# Patient Record
Sex: MALE | Race: WHITE | NOT HISPANIC OR LATINO | URBAN - METROPOLITAN AREA
[De-identification: names, ages, dates, MRNs, and addresses within clinical notes are randomized per-mention and may not be internally consistent; named-entity substitution may affect disease eponyms.]

---

## 2023-11-03 ENCOUNTER — HOSPITAL ENCOUNTER (EMERGENCY)
Facility: HOSPITAL | Age: 33
Discharge: HOME | End: 2023-11-03
Attending: EMERGENCY MEDICINE

## 2023-11-03 VITALS
WEIGHT: 230 LBS | RESPIRATION RATE: 18 BRPM | TEMPERATURE: 97.9 F | HEIGHT: 68 IN | DIASTOLIC BLOOD PRESSURE: 92 MMHG | SYSTOLIC BLOOD PRESSURE: 146 MMHG | OXYGEN SATURATION: 97 % | HEART RATE: 79 BPM | BODY MASS INDEX: 34.86 KG/M2

## 2023-11-03 DIAGNOSIS — R21 RASH: ICD-10-CM

## 2023-11-03 DIAGNOSIS — L73.9 FOLLICULITIS: Primary | ICD-10-CM

## 2023-11-03 LAB
ALBUMIN SERPL BCP-MCNC: 4.3 G/DL (ref 3.4–5)
ALP SERPL-CCNC: 101 U/L (ref 33–120)
ALT SERPL W P-5'-P-CCNC: 93 U/L (ref 10–52)
ANION GAP SERPL CALC-SCNC: 13 MMOL/L (ref 10–20)
AST SERPL W P-5'-P-CCNC: 57 U/L (ref 9–39)
BILIRUB SERPL-MCNC: 0.7 MG/DL (ref 0–1.2)
BUN SERPL-MCNC: 9 MG/DL (ref 6–23)
CALCIUM SERPL-MCNC: 9.1 MG/DL (ref 8.6–10.3)
CHLORIDE SERPL-SCNC: 103 MMOL/L (ref 98–107)
CO2 SERPL-SCNC: 25 MMOL/L (ref 21–32)
CREAT SERPL-MCNC: 0.75 MG/DL (ref 0.5–1.3)
ERYTHROCYTE [DISTWIDTH] IN BLOOD BY AUTOMATED COUNT: 12.4 % (ref 11.5–14.5)
GFR SERPL CREATININE-BSD FRML MDRD: >90 ML/MIN/1.73M*2
GLUCOSE SERPL-MCNC: 111 MG/DL (ref 74–99)
HCT VFR BLD AUTO: 43.3 % (ref 41–52)
HGB BLD-MCNC: 15.5 G/DL (ref 13.5–17.5)
MCH RBC QN AUTO: 31.1 PG (ref 26–34)
MCHC RBC AUTO-ENTMCNC: 35.8 G/DL (ref 32–36)
MCV RBC AUTO: 87 FL (ref 80–100)
NRBC BLD-RTO: 0 /100 WBCS (ref 0–0)
PLATELET # BLD AUTO: 208 X10*3/UL (ref 150–450)
POTASSIUM SERPL-SCNC: 3.6 MMOL/L (ref 3.5–5.3)
PROT SERPL-MCNC: 7.8 G/DL (ref 6.4–8.2)
RBC # BLD AUTO: 4.99 X10*6/UL (ref 4.5–5.9)
SODIUM SERPL-SCNC: 137 MMOL/L (ref 136–145)
WBC # BLD AUTO: 9.6 X10*3/UL (ref 4.4–11.3)

## 2023-11-03 PROCEDURE — 99284 EMERGENCY DEPT VISIT MOD MDM: CPT | Performed by: EMERGENCY MEDICINE

## 2023-11-03 PROCEDURE — 80053 COMPREHEN METABOLIC PANEL: CPT | Performed by: EMERGENCY MEDICINE

## 2023-11-03 PROCEDURE — 36415 COLL VENOUS BLD VENIPUNCTURE: CPT | Performed by: EMERGENCY MEDICINE

## 2023-11-03 PROCEDURE — 85027 COMPLETE CBC AUTOMATED: CPT | Performed by: EMERGENCY MEDICINE

## 2023-11-03 PROCEDURE — 99283 EMERGENCY DEPT VISIT LOW MDM: CPT

## 2023-11-03 RX ORDER — CHLORHEXIDINE GLUCONATE 40 MG/ML
SOLUTION TOPICAL DAILY PRN
Qty: 120 ML | Refills: 0 | Status: SHIPPED | OUTPATIENT
Start: 2023-11-03

## 2023-11-03 RX ORDER — CEPHALEXIN 500 MG/1
500 CAPSULE ORAL 4 TIMES DAILY
Qty: 40 CAPSULE | Refills: 0 | Status: SHIPPED | OUTPATIENT
Start: 2023-11-03 | End: 2023-11-13

## 2023-11-03 ASSESSMENT — COLUMBIA-SUICIDE SEVERITY RATING SCALE - C-SSRS
1. IN THE PAST MONTH, HAVE YOU WISHED YOU WERE DEAD OR WISHED YOU COULD GO TO SLEEP AND NOT WAKE UP?: NO
2. HAVE YOU ACTUALLY HAD ANY THOUGHTS OF KILLING YOURSELF?: NO
6. HAVE YOU EVER DONE ANYTHING, STARTED TO DO ANYTHING, OR PREPARED TO DO ANYTHING TO END YOUR LIFE?: NO

## 2023-11-03 ASSESSMENT — LIFESTYLE VARIABLES
HAVE PEOPLE ANNOYED YOU BY CRITICIZING YOUR DRINKING: NO
HAVE YOU EVER FELT YOU SHOULD CUT DOWN ON YOUR DRINKING: NO
REASON UNABLE TO ASSESS: NO
EVER FELT BAD OR GUILTY ABOUT YOUR DRINKING: NO
EVER HAD A DRINK FIRST THING IN THE MORNING TO STEADY YOUR NERVES TO GET RID OF A HANGOVER: NO

## 2023-11-03 ASSESSMENT — PAIN - FUNCTIONAL ASSESSMENT: PAIN_FUNCTIONAL_ASSESSMENT: 0-10

## 2023-11-03 NOTE — DISCHARGE INSTRUCTIONS
Seek immediate medical attention if you develop: increasing redness of your skin, worsening rash, difficulty swallowing, drooling, difficulty breathing, chest pain, shortness of breath, lightheadedness, dizziness, fever, chills, or worsening symptoms.    Follow up your liver blood work with your doctor.    Keep your skin dry.

## 2023-11-03 NOTE — ED PROVIDER NOTES
HPI   Chief Complaint   Patient presents with    Rash     Stomach and arms, x few weeks, pt was given medications and improved, rash returned       33-year-old male to [facility] part with chief complaint of rash.  He had a rash around 2 weeks ago start in his thighs and abdomen.  He went to urgent care and they put him on an antihistamine and some steroids and he states it improved.  States last night it started coming back.  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  No other issues or complaints.  No fevers or chills or sweats.  No sore throat.  No oral lesions.  No chest pain or shortness of breath or cough.  No abdominal pain or nausea or vomiting or diarrhea or urinary symptoms.  No headaches or dizziness.    Past Medical History: None    Surgical History: Elbow surgery    Medications: See Nursing Notes    Allergies: No known drug allergies    Social History: No tobacco, occasional marijuana, occasional alcohol    Primary Care Physician: None    Unless otherwise stated in this report the patient's positive and negative responses for review of systems for constitutional, eyes, ENT, cardiovascular, respiratory, gastrointestinal, neurological, genitourinary, musculoskeletal, and integument systems and related systems to the presenting problem are either as stated in the HPI or were not pertinent or were negative for the symptoms and/or complaints related to the presenting medical problem.      EXAM:  Vital signs reviewed  General:  Appears well  Alert  HEENT:  Head atraumatic  Eyes normal inspection  Normal ENT inspection, no oral lesions  NECK:  Normal inspection  RESPIRATORY:  Normal breath sounds, no wheezing or rhonchi  No chest wall tenderness  No respiratory distress  CVS:  Heart rate and rhythm regular  No Murmurs  ABDOMEN/GI:  Soft  Non-tender  Bowel sounds normal  FEMALE GENITAL:  []  MALE GENITAL:  []  RECTAL:  []  BACK:  Normal inspection  EXTREMITIES:  Non-Tender  Full ROM  Normal  appearance  NEURO:  Alert and oriented X 3  CN's normal as tested  Sensation normal  Motor normal  PSYCH:  Mood normal  Affect normal  SKIN:  Color normal  There is an erythematous rash on his abdomen.  It is only on the lower abdomen and under the area of the pannus.  It is erythematous and does not mckay.  There are some areas that he has been scratching at.  Is also a rash on his inner thighs.  It looks more follicular.  There are areas that he is been scratching.  He also has athlete's foot on his feet.  Warm  Dry    Since there is a nonblanching rash and it is almost a petechial component on the abdomen, we will check lab work to make sure his platelets are okay.  The rash does not involve his lower legs or his feet.  There is no involvement of his hands.  No involvement of the palms or soles of his hands and feet.  There is no oral lesions.                                  [place] Coma Scale Score: 15                  Patient History   History reviewed. No pertinent past medical history.  History reviewed. No pertinent surgical history.  No family history on file.  Social History     Tobacco Use    Smoking status: Never    Smokeless tobacco: Never   Vaping Use    Vaping Use: Not on file   Substance Use Topics    Alcohol use: Yes     Comment: socially    Drug use: Never       Physical Exam   ED Triage Vitals [11/03/23 1202]   Temp Heart Rate Resp BP   36.6 °C (97.9 °F) 90 18 --      SpO2 Temp Source Heart Rate Source Patient Position   98 % Temporal Monitor Sitting      BP Location FiO2 (%)     Right arm --       Physical Exam    ED Course & MDM   Diagnoses as of 11/03/23 2037   Folliculitis   Rash       Medical Decision Making  Chemistries unremarkable except for mildly elevated AST and ALT.  Does drink alcohol.  Is advised to decrease his alcohol consumption.  CBC has no significant abnormalities.  Platelets are normal.    Patient is given chlorhexidine wash because his rash does have some consistency of  folliculitis.  He is also discharged home with a prescription for Keflex.  He is to follow-up with dermatology and primary care.  He is to return to the nearest ER for any new or worsening symptoms.  Is happy with this plan.        Procedure  Procedures     Dragan Molina,   11/03/23 2038

## 2023-11-17 ENCOUNTER — LAB (OUTPATIENT)
Dept: LAB | Facility: LAB | Age: 33
End: 2023-11-17

## 2023-11-17 DIAGNOSIS — L30.9 DERMATITIS, UNSPECIFIED: Primary | ICD-10-CM

## 2023-11-17 LAB
ANION GAP SERPL CALC-SCNC: 15 MMOL/L (ref 10–20)
BUN SERPL-MCNC: 11 MG/DL (ref 6–23)
CALCIUM SERPL-MCNC: 9.8 MG/DL (ref 8.6–10.3)
CHLORIDE SERPL-SCNC: 103 MMOL/L (ref 98–107)
CO2 SERPL-SCNC: 27 MMOL/L (ref 21–32)
CREAT SERPL-MCNC: 0.86 MG/DL (ref 0.5–1.3)
GFR SERPL CREATININE-BSD FRML MDRD: >90 ML/MIN/1.73M*2
GLUCOSE SERPL-MCNC: 96 MG/DL (ref 74–99)
POTASSIUM SERPL-SCNC: 4 MMOL/L (ref 3.5–5.3)
SODIUM SERPL-SCNC: 141 MMOL/L (ref 136–145)

## 2023-11-17 PROCEDURE — 36415 COLL VENOUS BLD VENIPUNCTURE: CPT

## 2023-11-17 PROCEDURE — 80048 BASIC METABOLIC PNL TOTAL CA: CPT

## 2023-12-07 ENCOUNTER — APPOINTMENT (OUTPATIENT)
Dept: PRIMARY CARE | Facility: CLINIC | Age: 33
End: 2023-12-07

## 2024-12-23 ENCOUNTER — APPOINTMENT (OUTPATIENT)
Dept: PRIMARY CARE | Facility: CLINIC | Age: 34
End: 2024-12-23
Payer: MEDICARE

## 2024-12-30 ENCOUNTER — APPOINTMENT (OUTPATIENT)
Dept: PRIMARY CARE | Facility: CLINIC | Age: 34
End: 2024-12-30
Payer: MEDICARE

## 2025-01-14 ENCOUNTER — APPOINTMENT (OUTPATIENT)
Dept: PRIMARY CARE | Facility: CLINIC | Age: 35
End: 2025-01-14
Payer: MEDICARE

## 2025-01-14 ENCOUNTER — LAB (OUTPATIENT)
Dept: LAB | Facility: LAB | Age: 35
End: 2025-01-14
Payer: MEDICARE

## 2025-01-14 VITALS
RESPIRATION RATE: 16 BRPM | BODY MASS INDEX: 41.07 KG/M2 | WEIGHT: 271 LBS | OXYGEN SATURATION: 98 % | HEART RATE: 63 BPM | TEMPERATURE: 97 F | HEIGHT: 68 IN | DIASTOLIC BLOOD PRESSURE: 100 MMHG | SYSTOLIC BLOOD PRESSURE: 130 MMHG

## 2025-01-14 DIAGNOSIS — E66.813 CLASS 3 SEVERE OBESITY WITH BODY MASS INDEX (BMI) OF 40.0 TO 44.9 IN ADULT, UNSPECIFIED OBESITY TYPE, UNSPECIFIED WHETHER SERIOUS COMORBIDITY PRESENT: ICD-10-CM

## 2025-01-14 DIAGNOSIS — E66.01 CLASS 3 SEVERE OBESITY WITH BODY MASS INDEX (BMI) OF 40.0 TO 44.9 IN ADULT, UNSPECIFIED OBESITY TYPE, UNSPECIFIED WHETHER SERIOUS COMORBIDITY PRESENT: ICD-10-CM

## 2025-01-14 DIAGNOSIS — R03.0 ELEVATED BLOOD PRESSURE READING: ICD-10-CM

## 2025-01-14 DIAGNOSIS — F32.A ANXIETY AND DEPRESSION: ICD-10-CM

## 2025-01-14 DIAGNOSIS — Z13.9 SCREENING DUE: ICD-10-CM

## 2025-01-14 DIAGNOSIS — Z13.9 SCREENING DUE: Primary | ICD-10-CM

## 2025-01-14 DIAGNOSIS — F41.9 ANXIETY AND DEPRESSION: ICD-10-CM

## 2025-01-14 LAB
ALBUMIN SERPL BCP-MCNC: 4.7 G/DL (ref 3.4–5)
ALP SERPL-CCNC: 82 U/L (ref 33–120)
ALT SERPL W P-5'-P-CCNC: 49 U/L (ref 10–52)
ANION GAP SERPL CALC-SCNC: 12 MMOL/L (ref 10–20)
AST SERPL W P-5'-P-CCNC: 42 U/L (ref 9–39)
BASOPHILS # BLD AUTO: 0.04 X10*3/UL (ref 0–0.1)
BASOPHILS NFR BLD AUTO: 0.5 %
BILIRUB SERPL-MCNC: 1.2 MG/DL (ref 0–1.2)
BUN SERPL-MCNC: 17 MG/DL (ref 6–23)
CALCIUM SERPL-MCNC: 9.3 MG/DL (ref 8.6–10.3)
CHLORIDE SERPL-SCNC: 103 MMOL/L (ref 98–107)
CHOLEST SERPL-MCNC: 154 MG/DL (ref 0–199)
CHOLESTEROL/HDL RATIO: 2.9
CO2 SERPL-SCNC: 28 MMOL/L (ref 21–32)
CREAT SERPL-MCNC: 0.92 MG/DL (ref 0.5–1.3)
EGFRCR SERPLBLD CKD-EPI 2021: >90 ML/MIN/1.73M*2
EOSINOPHIL # BLD AUTO: 0.13 X10*3/UL (ref 0–0.7)
EOSINOPHIL NFR BLD AUTO: 1.8 %
ERYTHROCYTE [DISTWIDTH] IN BLOOD BY AUTOMATED COUNT: 12.3 % (ref 11.5–14.5)
GLUCOSE SERPL-MCNC: 103 MG/DL (ref 74–99)
HCT VFR BLD AUTO: 43.6 % (ref 41–52)
HDLC SERPL-MCNC: 53.7 MG/DL
HGB BLD-MCNC: 15.4 G/DL (ref 13.5–17.5)
IMM GRANULOCYTES # BLD AUTO: 0.04 X10*3/UL (ref 0–0.7)
IMM GRANULOCYTES NFR BLD AUTO: 0.5 % (ref 0–0.9)
LDLC SERPL CALC-MCNC: 79 MG/DL
LYMPHOCYTES # BLD AUTO: 1.9 X10*3/UL (ref 1.2–4.8)
LYMPHOCYTES NFR BLD AUTO: 25.9 %
MCH RBC QN AUTO: 30.5 PG (ref 26–34)
MCHC RBC AUTO-ENTMCNC: 35.3 G/DL (ref 32–36)
MCV RBC AUTO: 86 FL (ref 80–100)
MONOCYTES # BLD AUTO: 0.67 X10*3/UL (ref 0.1–1)
MONOCYTES NFR BLD AUTO: 9.1 %
NEUTROPHILS # BLD AUTO: 4.56 X10*3/UL (ref 1.2–7.7)
NEUTROPHILS NFR BLD AUTO: 62.2 %
NON HDL CHOLESTEROL: 100 MG/DL (ref 0–149)
NRBC BLD-RTO: 0 /100 WBCS (ref 0–0)
PLATELET # BLD AUTO: 197 X10*3/UL (ref 150–450)
POTASSIUM SERPL-SCNC: 4.3 MMOL/L (ref 3.5–5.3)
PROT SERPL-MCNC: 7.5 G/DL (ref 6.4–8.2)
RBC # BLD AUTO: 5.05 X10*6/UL (ref 4.5–5.9)
SODIUM SERPL-SCNC: 139 MMOL/L (ref 136–145)
TRIGL SERPL-MCNC: 108 MG/DL (ref 0–149)
TSH SERPL-ACNC: 1.57 MIU/L (ref 0.44–3.98)
VLDL: 22 MG/DL (ref 0–40)
WBC # BLD AUTO: 7.3 X10*3/UL (ref 4.4–11.3)

## 2025-01-14 PROCEDURE — 80053 COMPREHEN METABOLIC PANEL: CPT

## 2025-01-14 PROCEDURE — 99204 OFFICE O/P NEW MOD 45 MIN: CPT | Performed by: NURSE PRACTITIONER

## 2025-01-14 PROCEDURE — 80061 LIPID PANEL: CPT

## 2025-01-14 PROCEDURE — 84443 ASSAY THYROID STIM HORMONE: CPT

## 2025-01-14 PROCEDURE — 85025 COMPLETE CBC W/AUTO DIFF WBC: CPT

## 2025-01-14 RX ORDER — BUPROPION HYDROCHLORIDE 300 MG/1
1 TABLET ORAL
COMMUNITY
Start: 2024-12-19

## 2025-01-14 RX ORDER — GABAPENTIN 300 MG/1
300 CAPSULE ORAL 2 TIMES DAILY
COMMUNITY
Start: 2024-12-19

## 2025-01-14 ASSESSMENT — ENCOUNTER SYMPTOMS
FLANK PAIN: 0
LIGHT-HEADEDNESS: 0
SINUS PRESSURE: 0
PALPITATIONS: 0
SHORTNESS OF BREATH: 0
SLEEP DISTURBANCE: 0
POLYPHAGIA: 0
POLYDIPSIA: 0
SORE THROAT: 0
DIZZINESS: 0
BACK PAIN: 0
COUGH: 0
DEPRESSION: 0
CONSTIPATION: 0
FATIGUE: 0
NAUSEA: 0
EYE REDNESS: 0
HEADACHES: 0
WHEEZING: 0
ABDOMINAL PAIN: 0
SINUS PAIN: 0
CHILLS: 0
DIARRHEA: 0
DYSPHORIC MOOD: 1
DYSURIA: 0
FEVER: 0
VOMITING: 0
WEAKNESS: 0
NERVOUS/ANXIOUS: 1

## 2025-01-14 NOTE — PROGRESS NOTES
Subjective   Patient ID: González Brennan is a 34 y.o. male who presents for \[facility]\"" Care.    Patient is being seen today's to [facility] care and would like blood work done, He denies having any concerns during today's visit.       34 year old male (PMH: Anxiety/Depression, Grief) presents today to [facility] as a new patient. He states that he has been dealing with feelings of abandonment and anxiety/depression since his brother took his life 2 months ago. He was started on Gabapentin and Wellbutrin approximately 1 month ago and do feel that they are helping somewhat. He has also started therapy every other week. He states that he is sleeping OK. He feels that his energy level is normal during the day. His appetite is normal. His sister is a  and recently he started watching his diet and exercising. He has cut out all alcohol. He denies any SI/HI.   His BP is elevated today, he does admit to feeling nervous in the office.     Review of Systems   Constitutional:  Negative for chills, fatigue and fever.   HENT:  Negative for congestion, ear pain, sinus pressure, sinus pain and sore throat.    Eyes:  Negative for redness and visual disturbance.   Respiratory:  Negative for cough, shortness of breath and wheezing.    Cardiovascular:  Negative for chest pain, palpitations and leg swelling.   Gastrointestinal:  Negative for abdominal pain, constipation, diarrhea, nausea and vomiting.   Endocrine: Negative for polydipsia, polyphagia and polyuria.   Genitourinary:  Negative for dysuria, flank pain, genital sores, penile discharge, scrotal swelling, testicular pain and urgency.   Musculoskeletal:  Negative for back pain.   Skin:  Negative for rash.   Neurological:  Negative for dizziness, weakness, light-headedness and headaches.   Psychiatric/Behavioral:  Positive for dysphoric mood. Negative for sleep disturbance. The patient is nervous/anxious.        Objective   BP (!) 130/100 (BP Location: Right arm,  "Patient Position: Sitting, BP Cuff Size: Large adult)   Pulse 63   Temp 36.1 °C (97 °F) (Temporal)   Resp 16   Ht 1.727 m (5' 8\")   Wt 123 kg (271 lb)   SpO2 98%   BMI 41.21 kg/m²     Physical Exam  Vitals and nursing note reviewed.   Constitutional:       General: He is not in acute distress.     Appearance: Normal appearance. He is obese.   HENT:      Head: Normocephalic and atraumatic.      Right Ear: Tympanic membrane normal.      Left Ear: Tympanic membrane normal.      Nose: Nose normal. No congestion.      Mouth/Throat:      Mouth: Mucous membranes are moist.      Pharynx: Oropharynx is clear. No posterior oropharyngeal erythema.   Eyes:      Conjunctiva/sclera: Conjunctivae normal.      Pupils: Pupils are equal, round, and reactive to light.   Cardiovascular:      Rate and Rhythm: Normal rate and regular rhythm.      Heart sounds: Normal heart sounds.   Pulmonary:      Effort: Pulmonary effort is normal.      Breath sounds: Normal breath sounds.   Abdominal:      General: Abdomen is flat. Bowel sounds are normal.      Palpations: Abdomen is soft. There is no mass.      Tenderness: There is no abdominal tenderness. There is no right CVA tenderness, left CVA tenderness or guarding.   Musculoskeletal:      Right lower leg: No edema.      Left lower leg: No edema.   Lymphadenopathy:      Cervical: No cervical adenopathy.   Skin:     General: Skin is warm and dry.   Neurological:      Mental Status: He is oriented to person, place, and time.   Psychiatric:         Mood and Affect: Mood normal.         Behavior: Behavior normal.         Assessment/Plan   Problem List Items Addressed This Visit    None  Visit Diagnoses         Codes    Screening due    -  Primary Z13.9    Relevant Orders    Lipid panel    Tsh With Reflex To Free T4 If Abnormal    Elevated blood pressure reading     R03.0    Relevant Orders    Comprehensive Metabolic Panel    CBC and Auto Differential    Anxiety and depression     F41.9, " F32.A        Continue with current medications for chronic health conditions.   Encouraged healthy diet and regular exercise.   Check labs.   Encouraged checking BP at home, keeping log and bring with him to follow up appointment.   Follow up in 1 month for recheck, or sooner with any additional concerns.

## 2025-02-14 ENCOUNTER — APPOINTMENT (OUTPATIENT)
Dept: PRIMARY CARE | Facility: CLINIC | Age: 35
End: 2025-02-14
Payer: MEDICARE

## 2025-02-17 ENCOUNTER — APPOINTMENT (OUTPATIENT)
Dept: PRIMARY CARE | Facility: CLINIC | Age: 35
End: 2025-02-17
Payer: MEDICARE

## 2025-02-24 ENCOUNTER — APPOINTMENT (OUTPATIENT)
Dept: PRIMARY CARE | Facility: CLINIC | Age: 35
End: 2025-02-24
Payer: MEDICARE

## 2025-03-10 ENCOUNTER — APPOINTMENT (OUTPATIENT)
Dept: PRIMARY CARE | Facility: CLINIC | Age: 35
End: 2025-03-10
Payer: MEDICARE

## 2025-03-12 ENCOUNTER — APPOINTMENT (OUTPATIENT)
Dept: PRIMARY CARE | Facility: CLINIC | Age: 35
End: 2025-03-12
Payer: MEDICARE

## 2025-03-24 ENCOUNTER — APPOINTMENT (OUTPATIENT)
Dept: PRIMARY CARE | Facility: CLINIC | Age: 35
End: 2025-03-24
Payer: MEDICARE

## 2025-03-25 ENCOUNTER — APPOINTMENT (OUTPATIENT)
Dept: PRIMARY CARE | Facility: CLINIC | Age: 35
End: 2025-03-25
Payer: MEDICARE

## 2025-04-01 ENCOUNTER — APPOINTMENT (OUTPATIENT)
Dept: PRIMARY CARE | Facility: CLINIC | Age: 35
End: 2025-04-01
Payer: MEDICARE

## 2025-04-07 ENCOUNTER — APPOINTMENT (OUTPATIENT)
Dept: PRIMARY CARE | Facility: CLINIC | Age: 35
End: 2025-04-07
Payer: MEDICARE

## 2025-04-07 VITALS
WEIGHT: 265 LBS | HEIGHT: 68 IN | OXYGEN SATURATION: 95 % | RESPIRATION RATE: 16 BRPM | TEMPERATURE: 98.3 F | SYSTOLIC BLOOD PRESSURE: 140 MMHG | BODY MASS INDEX: 40.16 KG/M2 | DIASTOLIC BLOOD PRESSURE: 90 MMHG | HEART RATE: 87 BPM

## 2025-04-07 DIAGNOSIS — F41.9 ANXIETY AND DEPRESSION: ICD-10-CM

## 2025-04-07 DIAGNOSIS — E66.813 CLASS 3 SEVERE OBESITY WITH BODY MASS INDEX (BMI) OF 40.0 TO 44.9 IN ADULT, UNSPECIFIED OBESITY TYPE, UNSPECIFIED WHETHER SERIOUS COMORBIDITY PRESENT: Primary | ICD-10-CM

## 2025-04-07 DIAGNOSIS — E66.01 CLASS 3 SEVERE OBESITY WITH BODY MASS INDEX (BMI) OF 40.0 TO 44.9 IN ADULT, UNSPECIFIED OBESITY TYPE, UNSPECIFIED WHETHER SERIOUS COMORBIDITY PRESENT: Primary | ICD-10-CM

## 2025-04-07 DIAGNOSIS — F32.A ANXIETY AND DEPRESSION: ICD-10-CM

## 2025-04-07 DIAGNOSIS — I10 HYPERTENSION, UNSPECIFIED TYPE: ICD-10-CM

## 2025-04-07 PROCEDURE — 99214 OFFICE O/P EST MOD 30 MIN: CPT | Performed by: NURSE PRACTITIONER

## 2025-04-07 RX ORDER — BUPROPION HYDROBROMIDE 522 MG/1
522 TABLET, EXTENDED RELEASE ORAL DAILY
COMMUNITY

## 2025-04-07 RX ORDER — LOSARTAN POTASSIUM 25 MG/1
25 TABLET ORAL DAILY
Qty: 30 TABLET | Refills: 1 | Status: SHIPPED | OUTPATIENT
Start: 2025-04-07 | End: 2025-06-06

## 2025-04-07 ASSESSMENT — ENCOUNTER SYMPTOMS
COUGH: 0
HYPERTENSION: 1
POLYDIPSIA: 0
DYSURIA: 0
DYSPHORIC MOOD: 1
DIZZINESS: 0
FEVER: 0
SINUS PAIN: 0
HEMATURIA: 0
IRRITABILITY: 0
APPETITE CHANGE: 0
NERVOUS/ANXIOUS: 1
ABDOMINAL DISTENTION: 0
TROUBLE SWALLOWING: 0
ACTIVITY CHANGE: 0
COLOR CHANGE: 0
NECK STIFFNESS: 0
DECREASED CONCENTRATION: 1
DEPRESSED MOOD: 1
ABDOMINAL PAIN: 0
STRIDOR: 0
HEADACHES: 0
EYE PAIN: 0
CONFUSION: 0
SINUS PRESSURE: 0
PHOTOPHOBIA: 0
SLEEP DISTURBANCE: 0
ARTHRALGIAS: 0
ADENOPATHY: 0
DIARRHEA: 0
FATIGUE: 0
MYALGIAS: 0
RHINORRHEA: 0
RECTAL PAIN: 0
SEIZURES: 0
SPEECH DIFFICULTY: 0
SORE THROAT: 0
CONSTIPATION: 0
FLANK PAIN: 0
AGITATION: 0
BLOOD IN STOOL: 0
CONSTITUTIONAL NEGATIVE: 1
CHEST TIGHTNESS: 0
PALPITATIONS: 0
POLYPHAGIA: 0
SHORTNESS OF BREATH: 0

## 2025-04-07 NOTE — PROGRESS NOTES
Subjective   Patient ID: Michele Brennan is a 34 y.o. male who presents for Anxiety (Patient presents in office for a follow up anxiety and depression. Patient is currently being prescribed Alplenzin and Wellbutrin. Patient admits he has been experiencing difficulties with focusing. ).    Anxiety  Presents for follow-up visit. Symptoms include decreased concentration, depressed mood and nervous/anxious behavior. Patient reports no chest pain, confusion, dizziness, irritability, palpitations, shortness of breath or suicidal ideas. The severity of symptoms is interfering with daily activities. The quality of sleep is good.       Hypertension  This is a new problem. The current episode started more than 1 month ago. The problem is unchanged. The problem is uncontrolled. Associated symptoms include anxiety. Pertinent negatives include no chest pain, headaches, palpitations, peripheral edema or shortness of breath. Past treatments include nothing.      Review of Systems   Constitutional: Negative.  Negative for activity change, appetite change, fatigue, fever and irritability.   HENT:  Negative for congestion, dental problem, ear discharge, ear pain, mouth sores, rhinorrhea, sinus pressure, sinus pain, sore throat, tinnitus and trouble swallowing.    Eyes:  Negative for photophobia, pain and visual disturbance.   Respiratory:  Negative for cough, chest tightness, shortness of breath and stridor.    Cardiovascular:  Negative for chest pain and palpitations.   Gastrointestinal:  Negative for abdominal distention, abdominal pain, blood in stool, constipation, diarrhea and rectal pain.   Endocrine: Negative for cold intolerance, heat intolerance, polydipsia, polyphagia and polyuria.   Genitourinary:  Negative for dysuria, flank pain, hematuria and urgency.   Musculoskeletal:  Negative for arthralgias, gait problem, myalgias and neck stiffness.   Skin:  Negative for color change and rash.   Allergic/Immunologic: Negative for  "environmental allergies and food allergies.   Neurological:  Negative for dizziness, seizures, syncope, speech difficulty and headaches.   Hematological:  Negative for adenopathy.   Psychiatric/Behavioral:  Positive for decreased concentration and dysphoric mood. Negative for agitation, confusion, sleep disturbance and suicidal ideas. The patient is nervous/anxious.      Objective   /90 (BP Location: Left arm, Patient Position: Sitting, BP Cuff Size: Large adult)   Pulse 87   Temp 36.8 °C (98.3 °F) (Temporal)   Resp 16   Ht 1.727 m (5' 8\")   Wt 120 kg (265 lb)   SpO2 95%   BMI 40.29 kg/m²     Physical Exam  Vitals reviewed.   Constitutional:       General: He is not in acute distress.     Appearance: Normal appearance. He is obese. He is not ill-appearing or diaphoretic.   HENT:      Head: Normocephalic.   Cardiovascular:      Rate and Rhythm: Normal rate and regular rhythm.      Heart sounds: Normal heart sounds. No murmur heard.  Pulmonary:      Effort: Pulmonary effort is normal. No respiratory distress.      Breath sounds: Normal breath sounds. No wheezing or rales.   Chest:      Chest wall: No tenderness.   Musculoskeletal:      Cervical back: Normal range of motion and neck supple.      Right lower leg: No edema.      Left lower leg: No edema.   Skin:     General: Skin is warm and dry.   Neurological:      Mental Status: He is alert and oriented to person, place, and time. Mental status is at baseline.      Sensory: No sensory deficit.      Coordination: Coordination normal.      Gait: Gait normal.   Psychiatric:         Mood and Affect: Mood normal.         Thought Content: Thought content normal.         Judgment: Judgment normal.       Assessment/Plan   Problem List Items Addressed This Visit             ICD-10-CM    Anxiety and depression F41.9, F32.A     Other Visit Diagnoses         Codes    Class 3 severe obesity with body mass index (BMI) of 40.0 to 44.9 in adult, unspecified obesity type, " unspecified whether serious comorbidity present    -  Primary E66.813, E66.01, Z68.41    Hypertension, unspecified type     I10    Relevant Medications    losartan (Cozaar) 25 mg tablet          Patient following with psychiatry who just started him on a sample of Aplenzin.     HTN: Not at goal- start losartan as directed. Encouraged healthy diet and regular exercise.     Follow up in 2-3 weeks with clinic MD/DO, or sooner with any additional concerns.     Scribe Attestation  By signing my name below, I, RODNEY Hernandez   , Kelsey   attest that this documentation has been prepared under the direction and in the presence of LEENA Tabares.     Provider Attestation - Scribe documentation    All medical record entries made by the Scribe were at my direction and personally dictated by me. I have reviewed the chart and agree that the record accurately reflects my personal performance of the history, physical exam, discussion and plan.

## 2025-04-07 NOTE — PATIENT INSTRUCTIONS
Begin taking Losartan 25 mg one tablet daily.     Continue all current medications and therapy for chronic medical conditions     Patient was advised importance of proper diet/nutrition in addition adequate hydration. Patient was encouraged moderate exercise program to include 30 minutes daily for 5 days of the week or 150 minutes weekly. Patient will follow-up with us as scheduled.

## 2025-04-17 ENCOUNTER — APPOINTMENT (OUTPATIENT)
Dept: PRIMARY CARE | Facility: CLINIC | Age: 35
End: 2025-04-17
Payer: MEDICARE

## 2025-04-17 VITALS
BODY MASS INDEX: 41.19 KG/M2 | RESPIRATION RATE: 16 BRPM | TEMPERATURE: 98 F | WEIGHT: 271.8 LBS | HEIGHT: 68 IN | SYSTOLIC BLOOD PRESSURE: 134 MMHG | OXYGEN SATURATION: 95 % | DIASTOLIC BLOOD PRESSURE: 78 MMHG | HEART RATE: 60 BPM

## 2025-04-17 DIAGNOSIS — F32.A ANXIETY AND DEPRESSION: Primary | ICD-10-CM

## 2025-04-17 DIAGNOSIS — E66.01 MORBID OBESITY WITH BODY MASS INDEX (BMI) OF 40.0 TO 49.9 (MULTI): ICD-10-CM

## 2025-04-17 DIAGNOSIS — I10 HYPERTENSION, UNSPECIFIED TYPE: ICD-10-CM

## 2025-04-17 DIAGNOSIS — F43.9 STRESS: ICD-10-CM

## 2025-04-17 DIAGNOSIS — F41.9 ANXIETY AND DEPRESSION: Primary | ICD-10-CM

## 2025-04-17 PROCEDURE — 99214 OFFICE O/P EST MOD 30 MIN: CPT | Performed by: FAMILY MEDICINE

## 2025-04-17 RX ORDER — PHENTERMINE HYDROCHLORIDE 37.5 MG/1
37.5 TABLET ORAL
Qty: 30 TABLET | Refills: 0 | Status: SHIPPED | OUTPATIENT
Start: 2025-04-17 | End: 2025-05-17

## 2025-04-17 RX ORDER — LOSARTAN POTASSIUM 50 MG/1
50 TABLET ORAL DAILY
Qty: 90 TABLET | Refills: 1 | Status: SHIPPED | OUTPATIENT
Start: 2025-04-17 | End: 2025-10-14

## 2025-04-17 RX ORDER — GABAPENTIN 300 MG/1
300 CAPSULE ORAL 2 TIMES DAILY
Qty: 90 CAPSULE | Refills: 0 | Status: SHIPPED | OUTPATIENT
Start: 2025-04-17

## 2025-04-17 RX ORDER — BUPROPION HYDROBROMIDE 522 MG/1
522 TABLET, EXTENDED RELEASE ORAL DAILY
Qty: 90 TABLET | Refills: 1 | Status: SHIPPED | OUTPATIENT
Start: 2025-04-17

## 2025-04-17 ASSESSMENT — ENCOUNTER SYMPTOMS
DEPRESSION: 0
OCCASIONAL FEELINGS OF UNSTEADINESS: 0
LOSS OF SENSATION IN FEET: 0

## 2025-04-17 ASSESSMENT — PATIENT HEALTH QUESTIONNAIRE - PHQ9
SUM OF ALL RESPONSES TO PHQ9 QUESTIONS 1 AND 2: 0
2. FEELING DOWN, DEPRESSED OR HOPELESS: NOT AT ALL
1. LITTLE INTEREST OR PLEASURE IN DOING THINGS: NOT AT ALL

## 2025-04-17 NOTE — PROGRESS NOTES
"Subjective   Patient ID: González Brennan \"Michele\" is a 35 y.o. male who presents for Establish Care (Patient is at the office today requesting refill on Gabapentin and Aplenzin. Medication wrer prescribed by  to manage Anxiety.//Oatient reports no other concerns today,).  History of Present Illness  González Brennan \"Michele\" is a 35-year-old male with depression and anxiety who presents for medication management and follow-up.    He is currently taking Aplenzin for depression and anxiety, which he finds effective. He was previously on Wellbutrin and understands that Aplenzin is a different delivery system of the same medication. He is stable on this medication, and his insurance covers it.    He is also taking gabapentin three times a day, one in the morning and two in the evening, primarily for anxiety. He has no chronic pain or nerve pain. He uses marijuana but is willing to stop as it may interfere with his medication regimen.    He has a history of elevated blood pressure and is currently on losartan, which was prescribed by Katherine.    Recent blood work showed good results for thyroid function, red and white blood cell counts, and cholesterol levels. His sugar levels were slightly elevated but not significantly.    He has a family history of mental health issues, as his brother took his own life in November. This has impacted his mental health management and medication needs.    Review of Systems  12 Systems have been reviewed as follows.  Constitutional: Fever, weight gain, weight loss, appetite change, night sweats, fatigue, chills.  Eyes : blurry, double vision, vision, loss, tearing, redness, pain, sensitivity to light, glaucoma.  Ears, nose, mouth, and throat: Hearing loss, ringing in the ears, ear pain, nasal congestion, nasal drainage, nosebleeds, mouth, throat, irritation tooth problem.  Cardiovascular :chest pain, pressure, heart racing, palpitations, sweating, leg swelling, high or low blood " "pressure  Pulmonary: Cough, yellow or green sputum, blood and sputum, shortness of breath, wheezing  Gastrointestinal: Nausea, vomiting, diarrhea, constipation, pain, blood in stool, or vomitus, heartburn, difficulty swallowing  Genitourinary: incontinence, abnormal bleeding, abnormal discharge, urinary frequency, urinary hesitancy, pain, impotence sexual problem, infection, urinary retention  Musculoskeletal: Pain, stiffness, joint, redness or warmth, arthritis, back pain, weakness, muscle wasting, sprain or fracture  Neuro: Weight weakness, dizziness, change in voice, change in taste change in vision, change in hearing, loss, or change of sensation, trouble walking, balance problems coordination problems, shaking, speech problem  Endocrine , cold or heat intolerance, blood sugar problem, weight gain or loss missed periods hot flashes, sweats, change in body hair, change in libido, increased thirst, increased urination  Heme/lymph: Swelling, bleeding, problem anemia, bruising, enlarged lymph nodes  Allergic/immunologic: H. plus nasal drip, watery itchy eyes, nasal drainage, immunosuppressed  The above were reviewed and noted negative except as noted in HPI and Problem List.    Objective     /78 (BP Location: Right arm, Patient Position: Sitting, BP Cuff Size: Large adult)   Pulse 60   Temp 36.7 °C (98 °F) (Temporal)   Resp 16   Ht 1.727 m (5' 8\")   Wt 123 kg (271 lb 12.8 oz)   SpO2 95%   BMI 41.33 kg/m²      Physical Exam    Constitutional: Well developed, well nourished, alert and in no acute distress   Eyes: Normal external exam. Pupils equally round and reactive to light with normal accommodation and extraocular movements intact.  Neck: Supple, no lymphadenopathy or masses.   Cardiovascular: Regular rate and rhythm, normal S1 and S2, no murmurs, gallops, or rubs. Radial pulses normal. No peripheral edema.  Pulmonary: No respiratory distress, lungs clear to auscultation bilaterally. No wheezes, " rhonchi, rales.  Abdomen: soft,non tender, non distended, without masses or HSM  Skin: Warm, well perfused, normal skin turgor and color.   Neurologic: Cranial nerves II-XII grossly intact.   Psychiatric: Mood calm and affect normal  Musculoskeletal: Moving all extremities without restriction  The above were reviewed and noted negative except as noted in HPI and Problem List.      Results  LABS  Cholesterol: 154 mg/dL (04/10/2025)  LDL: 79 mg/dL (04/10/2025)         Assessment & Plan  Depression and Anxiety  He is well-managed on Aplenzin for depression and anxiety, prescribed by his previous psychiatrist, Dr. Miner. He reports efficacy with the medication. Gabapentin is also utilized for anxiety management. Marijuana use may interfere with treatment, particularly as gabapentin is a controlled substance. He is willing to cease marijuana use to comply with federal regulations.  - Prescribe Aplenzin with a three-month supply.  - Prescribe gabapentin with a one-month supply.  - Conduct a urine drug screen in three months to monitor for marijuana use.  - Advise discontinuation of marijuana to ensure compliance with federal regulations regarding controlled substances.    Hypertension  He is on losartan 25 mg for hypertension. Dose escalation is considered to optimize blood pressure control, with typical dosing ranging from 50 to 100 mg.  - Increase losartan dose from 25 mg to 50 mg to improve blood pressure control.  - Monitor blood pressure in one month to assess the effectiveness of the dosage adjustment.    Weight Management  He is interested in weight loss options. Adipex (phentermine) is discussed as a cost-effective option. Alternatives like Ozempic and Mounjaro are considered too expensive and unlikely to be covered by his insurance. Adipex costs approximately $10 to $15 per month and will be sent to a local pharmacy known to stock it.  - Prescribe Adipex (phentermine) for weight loss with a one-month  supply.  - Schedule a follow-up appointment in one month to monitor blood pressure and assess the effectiveness of Adipex.    General Health Maintenance  Recent blood work, including liver function, thyroid function, and cholesterol levels, are within normal limits. His blood glucose level was slightly elevated but not significantly.  - Repeat blood work in August to monitor liver function, thyroid function, cholesterol levels, and blood glucose.    Problem List Items Addressed This Visit       Morbid obesity with body mass index (BMI) of 40.0 to 49.9 (Multi)    Relevant Medications    phentermine (Adipex-P) 37.5 mg tablet    Other Relevant Orders    Follow Up In [facility] - PCP - Established    Anxiety and depression - Primary    Relevant Medications    buPROPion HBr (Aplenzin) 522 mg tablet extended release 24 hr    gabapentin (Neurontin) 300 mg capsule    Other Relevant Orders    Follow Up In [facility] - PCP - Established     Other Visit Diagnoses         Stress        Relevant Medications    gabapentin (Neurontin) 300 mg capsule    Other Relevant Orders    Follow Up In [facility] - PCP - Established      Hypertension, unspecified type        Relevant Medications    losartan (Cozaar) 50 mg tablet    Other Relevant Orders    Follow Up In [facility] - PCP - Established            8/2025    [facility] nancy Gale MD       This medical note was created with the assistance of artificial intelligence (AI) for documentation purposes. The content has been reviewed and confirmed by the healthcare provider for accuracy and completeness. Patient consented to the use of audio recording and use of AI during their visit.

## 2025-04-22 DIAGNOSIS — F32.A ANXIETY AND DEPRESSION: ICD-10-CM

## 2025-04-22 DIAGNOSIS — F41.9 ANXIETY AND DEPRESSION: ICD-10-CM

## 2025-04-22 NOTE — TELEPHONE ENCOUNTER
Pt needs PA on   buPROPion HBr (Aplenzin) 522 mg tablet extended release 24 hr     Please advise.

## 2025-04-23 NOTE — TELEPHONE ENCOUNTER
Patient of  Katherine Love submitted for [facility] 522     It was denied because patient has to try three formulary medications before it is approved    Formulary medications are:    Bupropion he has been on and not working as well for him    Citalopram  Paroxetine  Setraline  Venlafaxine  Duloxetine    Patient is almost out of medication, I  will try for an appeal for him he was worried about switch medication because of side effects

## 2025-04-23 NOTE — TELEPHONE ENCOUNTER
Spoke with patient and advised him that Katherine is out of the office this week, but that we do have samples of the Aplenzin 522mg that he could  in the meantime.     Message has also been forwarded to Katherine for when she returns

## 2025-04-24 ENCOUNTER — TELEPHONE (OUTPATIENT)
Dept: PRIMARY CARE | Facility: CLINIC | Age: 35
End: 2025-04-24
Payer: MEDICARE

## 2025-04-24 RX ORDER — BUPROPION HYDROBROMIDE 522 MG/1
522 TABLET, EXTENDED RELEASE ORAL DAILY
Qty: 56 TABLET | Refills: 0 | Status: SHIPPED | COMMUNITY
Start: 2025-04-24

## 2025-04-24 NOTE — TELEPHONE ENCOUNTER
Patient appeal has been approved  for the aplenzin 522mg  It is approved from 03/24/2025 to 04/24/2026  Prior auth number: [id number]

## 2025-04-24 NOTE — TELEPHONE ENCOUNTER
Patient of Dr. Gale    Refill request  Pa has been approved for patient     Disp Refills Start End    Aplenzin 522 mg tablet extended release 24 hr 56 tablet 0 4/24/2025 --    Sig - Route: Take 522 mg by mouth once daily. - oral      [id number] - [place], [place] - [address] [facility] Phone: [phone]   Fax: [phone]

## 2025-05-21 ENCOUNTER — APPOINTMENT (OUTPATIENT)
Dept: PRIMARY CARE | Facility: CLINIC | Age: 35
End: 2025-05-21
Payer: MEDICARE

## 2025-05-21 VITALS
HEIGHT: 68 IN | OXYGEN SATURATION: 95 % | BODY MASS INDEX: 39.83 KG/M2 | WEIGHT: 262.8 LBS | RESPIRATION RATE: 16 BRPM | DIASTOLIC BLOOD PRESSURE: 70 MMHG | TEMPERATURE: 97.8 F | SYSTOLIC BLOOD PRESSURE: 132 MMHG | HEART RATE: 63 BPM

## 2025-05-21 DIAGNOSIS — I10 HYPERTENSION, UNSPECIFIED TYPE: ICD-10-CM

## 2025-05-21 DIAGNOSIS — F43.9 STRESS: ICD-10-CM

## 2025-05-21 DIAGNOSIS — F32.A ANXIETY AND DEPRESSION: ICD-10-CM

## 2025-05-21 DIAGNOSIS — E66.01 MORBID OBESITY WITH BODY MASS INDEX (BMI) OF 40.0 TO 49.9 (MULTI): ICD-10-CM

## 2025-05-21 DIAGNOSIS — F41.9 ANXIETY AND DEPRESSION: ICD-10-CM

## 2025-05-21 DIAGNOSIS — B35.1 ONYCHOMYCOSIS: Primary | ICD-10-CM

## 2025-05-21 PROCEDURE — 99214 OFFICE O/P EST MOD 30 MIN: CPT | Performed by: FAMILY MEDICINE

## 2025-05-21 RX ORDER — BUPROPION HYDROCHLORIDE 150 MG/1
150 TABLET ORAL EVERY MORNING
Qty: 90 TABLET | Refills: 1 | Status: SHIPPED | OUTPATIENT
Start: 2025-05-21 | End: 2025-11-17

## 2025-05-21 RX ORDER — TERBINAFINE HYDROCHLORIDE 250 MG/1
250 TABLET ORAL DAILY
Qty: 84 TABLET | Refills: 0 | Status: SHIPPED | OUTPATIENT
Start: 2025-05-21 | End: 2025-08-13

## 2025-05-21 RX ORDER — BUPROPION HYDROCHLORIDE 300 MG/1
300 TABLET ORAL EVERY MORNING
Qty: 90 TABLET | Refills: 1 | Status: SHIPPED | OUTPATIENT
Start: 2025-05-21 | End: 2025-11-17

## 2025-05-21 ASSESSMENT — ENCOUNTER SYMPTOMS
OCCASIONAL FEELINGS OF UNSTEADINESS: 0
DEPRESSION: 0
LOSS OF SENSATION IN FEET: 0

## 2025-05-21 NOTE — PROGRESS NOTES
"Subjective   Patient ID: González Brennan \"Michele\" is a 35 y.o. male who presents for Weight Loss (Patient reports he using half dose of Phentermine daily. No negative side effects from medication reported today. Patient current weight is 262.8 lbs.) and Med Management (Patient reports he would like to discuss Aplenzin prescription today.).  History of Present Illness  The patient presents for evaluation of anxiety, depression, weight management, and toenail fungus.    He reports an increase in anxiety levels, which he attributes to the use of Adipex. He has been adhering to a half-dose regimen of Adipex, which he finds more tolerable than the full dose. He is currently on a regimen of gabapentin, administered once in the morning and once at night, which he reports as beneficial for his anxiety. He does not experience any morning fatigue as a result of the medication. He expresses a desire to eventually discontinue all medications.    He has been managing his depression with Wellbutrin 300 mg but has not observed any significant changes since transitioning from Aplenzin. He reports no suicidal ideation and has abstained from marijuana use as previously advised. He is open to increasing the dosage of Wellbutrin to 450 mg.    He has been experiencing toenail fungus for an extended period, which recently resulted in the detachment of the affected nail.    He is on losartan for blood pressure management.    SOCIAL HISTORY  He does not use marijuana.  See Above  Review of Systems  12 Systems have been reviewed as follows.  Constitutional: Fever, weight gain, weight loss, appetite change, night sweats, fatigue, chills.  Eyes : blurry, double vision, vision, loss, tearing, redness, pain, sensitivity to light, glaucoma.  Ears, nose, mouth, and throat: Hearing loss, ringing in the ears, ear pain, nasal congestion, nasal drainage, nosebleeds, mouth, throat, irritation tooth problem.  Cardiovascular :chest pain, pressure, heart " "racing, palpitations, sweating, leg swelling, high or low blood pressure  Pulmonary: Cough, yellow or green sputum, blood and sputum, shortness of breath, wheezing  Gastrointestinal: Nausea, vomiting, diarrhea, constipation, pain, blood in stool, or vomitus, heartburn, difficulty swallowing  Genitourinary: incontinence, abnormal bleeding, abnormal discharge, urinary frequency, urinary hesitancy, pain, impotence sexual problem, infection, urinary retention  Musculoskeletal: Pain, stiffness, joint, redness or warmth, arthritis, back pain, weakness, muscle wasting, sprain or fracture  Neuro: Weight weakness, dizziness, change in voice, change in taste change in vision, change in hearing, loss, or change of sensation, trouble walking, balance problems coordination problems, shaking, speech problem  Endocrine , cold or heat intolerance, blood sugar problem, weight gain or loss missed periods hot flashes, sweats, change in body hair, change in libido, increased thirst, increased urination  Heme/lymph: Swelling, bleeding, problem anemia, bruising, enlarged lymph nodes  Allergic/immunologic: H. plus nasal drip, watery itchy eyes, nasal drainage, immunosuppressed  The above were reviewed and noted negative except as noted in HPI and Problem List.    Objective     /70 (BP Location: Right arm, Patient Position: Sitting, BP Cuff Size: Large adult)   Pulse 63   Temp 36.6 °C (97.8 °F) (Temporal)   Resp 16   Ht 1.727 m (5' 8\")   Wt 119 kg (262 lb 12.8 oz)   SpO2 95%   BMI 39.96 kg/m²      Physical Exam  Cardiovascular: Blood pressure is under good control.  Constitutional: Well developed, well nourished, alert and in no acute distress   Eyes: Normal external exam. Pupils equally round and reactive to light with normal accommodation and extraocular movements intact.  Neck: Supple, no lymphadenopathy or masses.   Cardiovascular: Regular rate and rhythm, normal S1 and S2, no murmurs, gallops, or rubs. Radial pulses " normal. No peripheral edema.  Pulmonary: No respiratory distress, lungs clear to auscultation bilaterally. No wheezes, rhonchi, rales.  Abdomen: soft,non tender, non distended, without masses or HSM  Skin: Warm, well perfused, normal skin turgor and color.   Neurologic: Cranial nerves II-XII grossly intact.   Psychiatric: Mood calm and affect normal  Musculoskeletal: Moving all extremities without restriction  The above were reviewed and noted negative except as noted in HPI and Problem List.      Results  Labs   - TSH: Normal   - Red Blood Cell Count: Normal   - White Blood Cell Count: Normal   - Kidney Function Test: Normal   - Liver Function Test: Normal   - Blood Sugar: Slightly high   - Cholesterol: 154         Assessment & Plan  1. Anxiety.  - Reports increased anxiety and has been using gabapentin effectively for anxiety and sleep.  - Will continue taking gabapentin twice daily.  - A urine drug screen will be conducted during the next visit due to the gabapentin prescription.  - If he remains stable for 4 to 6 months, a gradual reduction in gabapentin may be considered.    2. Depression.  - Will transition back to Wellbutrin, with an increased dosage to 450 mg daily, taken as two separate doses of 150 mg and 300 mg in the morning.  - This adjustment aims to improve his depressive symptoms.  - Will inform the pharmacist about the new dosage to ensure proper dispensing.  - Cymbalta prescribed for 90 days.    3. Weight management.  - Will continue his current regimen of Adipex, taking half to one tablet daily.  - Reports that taking half a tablet is effective without exacerbating anxiety.  - Will contact the office one week prior to needing a refill due to the controlled nature of the medication.  - PDMP checked and is as expected.    4. Toenail fungus.  - Will commence a course of Lamisil, taking one tablet daily for a duration of 84 days (12 weeks).  - This treatment aims to eradicate the fungal infection.  -  Toenail growth will be monitored to ensure the infection is resolving.    Follow-up  - Scheduled for a follow-up visit in 1 month. If stable, follow-up will be extended to every 3 months.    Problem List Items Addressed This Visit       Morbid obesity with body mass index (BMI) of 40.0 to 49.9 (Multi)    Relevant Medications    terbinafine (LamISIL) 250 mg tablet    Other Relevant Orders    Follow Up In [facility] - PCP - Established    Anxiety and depression    Relevant Medications    buPROPion XL (Wellbutrin XL) 150 mg 24 hr tablet    buPROPion XL (Wellbutrin XL) 300 mg 24 hr tablet    terbinafine (LamISIL) 250 mg tablet    Other Relevant Orders    Follow Up In [facility] - PCP - Established     Other Visit Diagnoses         Onychomycosis    -  Primary    Relevant Medications    terbinafine (LamISIL) 250 mg tablet      Stress        Relevant Medications    terbinafine (LamISIL) 250 mg tablet    Other Relevant Orders    Follow Up In [facility] - PCP - Established      Hypertension, unspecified type        Relevant Medications    terbinafine (LamISIL) 250 mg tablet    Other Relevant Orders    Follow Up In [facility] - PCP - Established                    BW 8/2025    UDS nancy Gale MD       This medical note was created with the assistance of artificial intelligence (AI) for documentation purposes. The content has been reviewed and confirmed by the healthcare provider for accuracy and completeness. Patient consented to the use of audio recording and use of AI during their visit.

## 2025-06-23 ENCOUNTER — APPOINTMENT (OUTPATIENT)
Dept: PRIMARY CARE | Facility: CLINIC | Age: 35
End: 2025-06-23
Payer: MEDICARE

## 2025-06-26 ENCOUNTER — OFFICE VISIT (OUTPATIENT)
Dept: PRIMARY CARE | Facility: CLINIC | Age: 35
End: 2025-06-26
Payer: MEDICARE

## 2025-06-26 VITALS
DIASTOLIC BLOOD PRESSURE: 86 MMHG | OXYGEN SATURATION: 92 % | BODY MASS INDEX: 39.04 KG/M2 | HEART RATE: 67 BPM | SYSTOLIC BLOOD PRESSURE: 120 MMHG | TEMPERATURE: 97.2 F | HEIGHT: 68 IN | WEIGHT: 257.6 LBS

## 2025-06-26 DIAGNOSIS — F43.9 STRESS: ICD-10-CM

## 2025-06-26 DIAGNOSIS — E66.01 MORBID OBESITY WITH BODY MASS INDEX (BMI) OF 40.0 TO 49.9 (MULTI): ICD-10-CM

## 2025-06-26 DIAGNOSIS — F41.9 ANXIETY AND DEPRESSION: ICD-10-CM

## 2025-06-26 DIAGNOSIS — F32.A ANXIETY AND DEPRESSION: ICD-10-CM

## 2025-06-26 PROCEDURE — 99214 OFFICE O/P EST MOD 30 MIN: CPT | Performed by: FAMILY MEDICINE

## 2025-06-26 RX ORDER — GABAPENTIN 300 MG/1
300 CAPSULE ORAL 2 TIMES DAILY
Qty: 60 CAPSULE | Refills: 0 | Status: SHIPPED | OUTPATIENT
Start: 2025-06-26

## 2025-06-26 RX ORDER — PHENTERMINE HYDROCHLORIDE 37.5 MG/1
37.5 TABLET ORAL
Qty: 30 TABLET | Refills: 0 | Status: SHIPPED | OUTPATIENT
Start: 2025-06-26 | End: 2025-07-26

## 2025-06-26 NOTE — PROGRESS NOTES
"Subjective   Patient ID: González Brennan \"Michele\" is a 35 y.o. male who presents for Weight Management (Patient is following up for weight management. He has lost 5 lbs in one month. Tries to eat a healthy diet and he exercises 3-4 times a week for about one hour.).  History of Present Illness  The patient presents for weight loss, anxiety, and onychomycosis.    He has been managing well since his last visit, with a notable weight loss of 5 pounds. His current medication regimen includes Adipex, which he believes has contributed to his increased energy levels. He is currently taking half a tablet of phentermine, which he reports as beneficial.    His anxiety appears to be under control. He is also on a combination of Wellbutrin 300 mg and 150 mg, taken together, which he finds effective. Additionally, he takes gabapentin twice daily.    He was started on gabapentin for his onychomycosis of the toenail.  See Above  Review of Systems  12 Systems have been reviewed as follows.  Constitutional: Fever, weight gain, weight loss, appetite change, night sweats, fatigue, chills.  Eyes : blurry, double vision, vision, loss, tearing, redness, pain, sensitivity to light, glaucoma.  Ears, nose, mouth, and throat: Hearing loss, ringing in the ears, ear pain, nasal congestion, nasal drainage, nosebleeds, mouth, throat, irritation tooth problem.  Cardiovascular :chest pain, pressure, heart racing, palpitations, sweating, leg swelling, high or low blood pressure  Pulmonary: Cough, yellow or green sputum, blood and sputum, shortness of breath, wheezing  Gastrointestinal: Nausea, vomiting, diarrhea, constipation, pain, blood in stool, or vomitus, heartburn, difficulty swallowing  Genitourinary: incontinence, abnormal bleeding, abnormal discharge, urinary frequency, urinary hesitancy, pain, impotence sexual problem, infection, urinary retention  Musculoskeletal: Pain, stiffness, joint, redness or warmth, arthritis, back pain, weakness, " "muscle wasting, sprain or fracture  Neuro: Weight weakness, dizziness, change in voice, change in taste change in vision, change in hearing, loss, or change of sensation, trouble walking, balance problems coordination problems, shaking, speech problem  Endocrine , cold or heat intolerance, blood sugar problem, weight gain or loss missed periods hot flashes, sweats, change in body hair, change in libido, increased thirst, increased urination  Heme/lymph: Swelling, bleeding, problem anemia, bruising, enlarged lymph nodes  Allergic/immunologic: H. plus nasal drip, watery itchy eyes, nasal drainage, immunosuppressed  The above were reviewed and noted negative except as noted in HPI and Problem List.    Objective     /86   Pulse 67   Temp 36.2 °C (97.2 °F) (Temporal)   Ht 1.727 m (5' 8\")   Wt 117 kg (257 lb 9.6 oz)   SpO2 92%   BMI 39.17 kg/m²      Physical Exam  General: Patient appears well and in no acute distress.  Constitutional: Well developed, well nourished, alert and in no acute distress   Eyes: Normal external exam. Pupils equally round and reactive to light with normal accommodation and extraocular movements intact.  Neck: Supple, no lymphadenopathy or masses.   Cardiovascular: Regular rate and rhythm, normal S1 and S2, no murmurs, gallops, or rubs. Radial pulses normal. No peripheral edema.  Pulmonary: No respiratory distress, lungs clear to auscultation bilaterally. No wheezes, rhonchi, rales.  Abdomen: soft,non tender, non distended, without masses or HSM  Skin: Warm, well perfused, normal skin turgor and color.   Neurologic: Cranial nerves II-XII grossly intact.   Psychiatric: Mood calm and affect normal  Musculoskeletal: Moving all extremities without restriction  The above were reviewed and noted negative except as noted in HPI and Problem List.      Results           Assessment & Plan  1. Weight loss.  - Lost 14 to 15 pounds since 04/2025.  - Currently taking half a tablet of phentermine " (Adipex) daily, aiding in weight loss and energy levels.  - Prescription for 30 tablets of phentermine provided, sufficient for two months.  - Advised to continue with the current dosage of half a tablet daily.    2. Anxiety.  - Anxiety is well-managed with the current medication regimen.  - Taking Wellbutrin 300 mg and 150 mg together, totaling 450 mg daily.  - Taking gabapentin twice a day.  - Prescription for 60 tablets of gabapentin provided, sufficient for one month. Advised to call in a week before needing a refill due to the controlled nature of the medication. A drug screen will be conducted in two months.    3. Onychomycosis.  - Currently on gabapentin for onychomycosis of the toenail.  - Condition expected to take time to improve as the nail grows out.     Depression.  - Will transition back to Wellbutrin, with an increased dosage to 450 mg daily,    - Follow-up scheduled in 2 months.    Problem List Items Addressed This Visit       Morbid obesity with body mass index (BMI) of 40.0 to 49.9 (Multi)    Relevant Medications    phentermine (Adipex-P) 37.5 mg tablet    Other Relevant Orders    Follow Up In [facility] - PCP - Established    Anxiety and depression    Relevant Medications    gabapentin (Neurontin) 300 mg capsule    Other Relevant Orders    Follow Up [facility] - PCP - Established     Other Visit Diagnoses         Stress        Relevant Medications    gabapentin (Neurontin) 300 mg capsule    Other Relevant Orders    Follow Up In [facility] - PCP - Established             Ramon Gale MD       This medical note was created with the assistance of artificial intelligence (AI) for documentation purposes. The content has been reviewed and confirmed by the healthcare provider for accuracy and completeness. Patient consented to the use of audio recording and use of AI during their visit.

## 2025-08-18 ENCOUNTER — APPOINTMENT (OUTPATIENT)
Dept: PRIMARY CARE | Facility: CLINIC | Age: 35
End: 2025-08-18
Payer: MEDICARE

## 2025-08-18 VITALS
OXYGEN SATURATION: 97 % | HEART RATE: 67 BPM | HEIGHT: 68 IN | WEIGHT: 251.2 LBS | SYSTOLIC BLOOD PRESSURE: 114 MMHG | BODY MASS INDEX: 38.07 KG/M2 | DIASTOLIC BLOOD PRESSURE: 78 MMHG | TEMPERATURE: 97 F

## 2025-08-18 DIAGNOSIS — J02.9 PHARYNGITIS, UNSPECIFIED ETIOLOGY: Primary | ICD-10-CM

## 2025-08-18 DIAGNOSIS — F41.9 ANXIETY AND DEPRESSION: ICD-10-CM

## 2025-08-18 DIAGNOSIS — I10 HYPERTENSION, UNSPECIFIED TYPE: ICD-10-CM

## 2025-08-18 DIAGNOSIS — F43.9 STRESS: ICD-10-CM

## 2025-08-18 DIAGNOSIS — E66.01 MORBID OBESITY WITH BODY MASS INDEX (BMI) OF 40.0 TO 49.9 (MULTI): ICD-10-CM

## 2025-08-18 DIAGNOSIS — B35.1 ONYCHOMYCOSIS: ICD-10-CM

## 2025-08-18 DIAGNOSIS — F32.A ANXIETY AND DEPRESSION: ICD-10-CM

## 2025-08-18 PROCEDURE — 99214 OFFICE O/P EST MOD 30 MIN: CPT | Performed by: FAMILY MEDICINE

## 2025-08-18 RX ORDER — PHENTERMINE HYDROCHLORIDE 37.5 MG/1
37.5 TABLET ORAL
Qty: 30 TABLET | Refills: 0 | Status: SHIPPED | OUTPATIENT
Start: 2025-08-26 | End: 2025-09-25

## 2025-08-18 RX ORDER — CEFDINIR 300 MG/1
300 CAPSULE ORAL 2 TIMES DAILY
Qty: 20 CAPSULE | Refills: 0 | Status: SHIPPED | OUTPATIENT
Start: 2025-08-18 | End: 2025-08-28

## 2025-08-18 RX ORDER — LOSARTAN POTASSIUM 50 MG/1
50 TABLET ORAL DAILY
Qty: 90 TABLET | Refills: 1 | Status: SHIPPED | OUTPATIENT
Start: 2025-08-18 | End: 2026-02-14

## 2025-08-18 RX ORDER — GABAPENTIN 300 MG/1
300 CAPSULE ORAL 2 TIMES DAILY
Qty: 60 CAPSULE | Refills: 0 | Status: SHIPPED | OUTPATIENT
Start: 2025-08-26

## 2025-08-18 RX ORDER — BUPROPION HYDROCHLORIDE 150 MG/1
150 TABLET ORAL EVERY MORNING
Qty: 90 TABLET | Refills: 1 | Status: SHIPPED | OUTPATIENT
Start: 2025-08-18 | End: 2026-02-14

## 2025-08-18 RX ORDER — SERTRALINE HYDROCHLORIDE 50 MG/1
50 TABLET, FILM COATED ORAL DAILY
Qty: 30 TABLET | Refills: 1 | Status: SHIPPED | OUTPATIENT
Start: 2025-08-18 | End: 2025-10-17

## 2025-08-18 RX ORDER — TERBINAFINE HYDROCHLORIDE 250 MG/1
250 TABLET ORAL DAILY
Qty: 84 TABLET | Refills: 0 | Status: SHIPPED | OUTPATIENT
Start: 2025-08-18 | End: 2025-11-10

## 2025-08-18 RX ORDER — BUPROPION HYDROCHLORIDE 300 MG/1
300 TABLET ORAL EVERY MORNING
Qty: 90 TABLET | Refills: 1 | Status: SHIPPED | OUTPATIENT
Start: 2025-08-18 | End: 2026-02-14

## 2025-08-29 ENCOUNTER — TELEPHONE (OUTPATIENT)
Dept: PRIMARY CARE | Facility: CLINIC | Age: 35
End: 2025-08-29
Payer: MEDICARE

## 2025-09-22 ENCOUNTER — APPOINTMENT (OUTPATIENT)
Dept: PRIMARY CARE | Facility: CLINIC | Age: 35
End: 2025-09-22
Payer: MEDICARE

## 2025-09-29 ENCOUNTER — APPOINTMENT (OUTPATIENT)
Dept: PRIMARY CARE | Facility: CLINIC | Age: 35
End: 2025-09-29
Payer: MEDICARE